# Patient Record
(demographics unavailable — no encounter records)

---

## 2024-10-22 NOTE — HISTORY OF PRESENT ILLNESS
[FreeTextEntry1] : Here with Mom and Dad  No parental concerns No ER visits in the last year No subspecialty visits in the last year  Very picky eater Lots of carbs Some processed food  Sugary drinks <2 Milk servings daily 2 Vegetable intake Rare Fruit intake Rare Eats 3 meals daily Yes Snacks daily 2 Skips meals: No Positive body image: Yes   Elimination: Normal, no constipation   Sleep: Normal pattern   Behavior concerns: No concerns Mood concerns: No concerns   Activity: Minutes active daily 60   School: IEP No Performance Normal Parent/teacher concerns None   Screen time amount <2 hours Screen time monitored Yes Screen time plan discussed Yes   Environment: Smoke exposure No Firearms in the home No Smoke/CO detectors Yes Wears seatbelt Yes   Dental home Yes Last visit Within last 1 year Brushes twice daily Yes Source of fluoride Toothpaste

## 2024-10-22 NOTE — PHYSICAL EXAM
[Alert] : alert [No Acute Distress] : no acute distress [Normocephalic] : normocephalic [EOMI Bilateral] : EOMI bilateral [Clear tympanic membranes with bony landmarks and light reflex present bilaterally] : clear tympanic membranes with bony landmarks and light reflex present bilaterally  [Pink Nasal Mucosa] : pink nasal mucosa [Nonerythematous Oropharynx] : nonerythematous oropharynx [Supple, full passive range of motion] : supple, full passive range of motion [No Palpable Masses] : no palpable masses [Clear to Auscultation Bilaterally] : clear to auscultation bilaterally [Regular Rate and Rhythm] : regular rate and rhythm [Normal S1, S2 audible] : normal S1, S2 audible [No Murmurs] : no murmurs [+2 Femoral Pulses] : +2 femoral pulses [Soft] : soft [NonTender] : non tender [Non Distended] : non distended [Normoactive Bowel Sounds] : normoactive bowel sounds [No Hepatomegaly] : no hepatomegaly [No Splenomegaly] : no splenomegaly [Kenneth: _____] : Kenneth [unfilled] [Bilateral descended testes] : bilateral descended testes [No Testicular Masses] : no testicular masses [No Abnormal Lymph Nodes Palpated] : no abnormal lymph nodes palpated [Normal Muscle Tone] : normal muscle tone [No Gait Asymmetry] : no gait asymmetry [No pain or deformities with palpation of bone, muscles, joints] : no pain or deformities with palpation of bone, muscles, joints [Straight] : straight [+2 Patella DTR] : +2 patella DTR [Cranial Nerves Grossly Intact] : cranial nerves grossly intact [No Rash or Lesions] : no rash or lesions [FreeTextEntry6] : Purpose of genital exam reviewed and chaperone policy discussed. Chaperone during today's visit: Mom and Dad

## 2024-10-22 NOTE — DISCUSSION/SUMMARY
[FreeTextEntry1] : Growing well, doing well in school, BMI secondary to height, little adipose tissue on exam, reviewed healthy nutrition at length, normal exam, defers flu today  - Vital signs reviewed and normal - Routine age appropriate bright futures topics discussed, including but not limited to the topics below - Provided positive reinforcement for healthy lifestyle, including encouraging physical activity, having a well rounded diet, limiting sugary snacks/drinks, and have a routine sleep schedule/getting at least 8 hours of sleep daily - Brush teeth twice daily and see dentist at least once yearly - Continue to work hard in school! Think about future plans and discuss any questions or concerns with your parents or school career advisors - Focus on healthy relationships with your peers and identify those who are a bad influence. Parents should get to know child's friends and encourage healthy relationships, avoid dangerous situations, and promote safety - Review safety with your child as they become more independent, such as knowing their home address and parents phone numbers, and who to call in case of an emergency - Getting a child their own phone is an individual family decision, however consider screen time limits and installing parental controls (you can reach out to your phone provider to learn how to do this) - AAP Bright Futures handout provided today - Follow up for next physical in 1 year   Discussed appropriate vaccines today, benefits / risks, and expected common side effects. All questions answered. VIS provided today. Call office for fever that lasts longer than 24 hours or for any parental concerns. Up to date vaccine record provided today.   Site cleaned prior to vaccine. Tolerated vaccine administration without issue.

## 2025-02-10 NOTE — REVIEW OF SYSTEMS
[Snoring] : snoring [Cough] : cough [Congestion] : congestion [Negative] : Genitourinary [Tachypnea] : not tachypneic [Wheezing] : no wheezing

## 2025-02-10 NOTE — HISTORY OF PRESENT ILLNESS
[de-identified] : Cough [FreeTextEntry6] : Yasmani is a 12yo M no PMH here for eval of persistent cough. Had URI in December and since then has had a persistent wet cough. Believes there was a brief period following the URI where the cough resolved but began again shortly after. Productive cough of white/yellow mucus. Cough worse at night but present during the day. Will wake up coughing up phlegm. Feels congested. Tried honey, humidifiers without relief. No fever, PERALTA, wheezing. No family hx of asthma.

## 2025-02-10 NOTE — HISTORY OF PRESENT ILLNESS
[de-identified] : Cough [FreeTextEntry6] : Yasmani is a 12yo M no PMH here for eval of persistent cough. Had URI in December and since then has had a persistent wet cough. Believes there was a brief period following the URI where the cough resolved but began again shortly after. Productive cough of white/yellow mucus. Cough worse at night but present during the day. Will wake up coughing up phlegm. Feels congested. Tried honey, humidifiers without relief. No fever, PERALTA, wheezing. No family hx of asthma.

## 2025-02-10 NOTE — PHYSICAL EXAM
[Rhonchi] : rhonchi [NL] : warm, clear [Wheezing] : no wheezing [Crackles] : no crackles [Tachypnea] : no tachypnea

## 2025-03-11 NOTE — HISTORY OF PRESENT ILLNESS
[de-identified] : Cough [FreeTextEntry6] : Yasmani is a 10 yo M here for f/u on cough, seen for wet cough x months treated for PBB with improvement however had another URI and was diagnosed with rhinoentero virus last week in the ER. In ER last Friday got an albuterol pump for wheezing heard during exam, lost pump at school this week. Has cough now, congestion, no audible wheezing.

## 2025-03-11 NOTE — HISTORY OF PRESENT ILLNESS
[de-identified] : Cough [FreeTextEntry6] : Yasmani is a 12 yo M here for f/u on cough, seen for wet cough x months treated for PBB with improvement however had another URI and was diagnosed with rhinoentero virus last week in the ER. In ER last Friday got an albuterol pump for wheezing heard during exam, lost pump at school this week. Has cough now, congestion, no audible wheezing.